# Patient Record
Sex: FEMALE | Race: OTHER | NOT HISPANIC OR LATINO
[De-identification: names, ages, dates, MRNs, and addresses within clinical notes are randomized per-mention and may not be internally consistent; named-entity substitution may affect disease eponyms.]

---

## 2020-12-14 ENCOUNTER — TRANSCRIPTION ENCOUNTER (OUTPATIENT)
Age: 65
End: 2020-12-14

## 2022-07-19 ENCOUNTER — NON-APPOINTMENT (OUTPATIENT)
Age: 67
End: 2022-07-19

## 2022-10-16 ENCOUNTER — NON-APPOINTMENT (OUTPATIENT)
Age: 67
End: 2022-10-16

## 2024-02-06 ENCOUNTER — NON-APPOINTMENT (OUTPATIENT)
Age: 69
End: 2024-02-06

## 2024-03-15 PROBLEM — Z00.00 ENCOUNTER FOR PREVENTIVE HEALTH EXAMINATION: Status: ACTIVE | Noted: 2024-03-15

## 2024-03-23 ENCOUNTER — NON-APPOINTMENT (OUTPATIENT)
Age: 69
End: 2024-03-23

## 2024-03-25 ENCOUNTER — NON-APPOINTMENT (OUTPATIENT)
Age: 69
End: 2024-03-25

## 2024-03-25 ENCOUNTER — APPOINTMENT (OUTPATIENT)
Dept: THORACIC SURGERY | Facility: CLINIC | Age: 69
End: 2024-03-25
Payer: MEDICARE

## 2024-03-25 VITALS
HEIGHT: 61 IN | RESPIRATION RATE: 17 BRPM | SYSTOLIC BLOOD PRESSURE: 151 MMHG | WEIGHT: 150 LBS | HEART RATE: 78 BPM | DIASTOLIC BLOOD PRESSURE: 83 MMHG | BODY MASS INDEX: 28.32 KG/M2 | OXYGEN SATURATION: 98 %

## 2024-03-25 DIAGNOSIS — Z87.891 PERSONAL HISTORY OF NICOTINE DEPENDENCE: ICD-10-CM

## 2024-03-25 DIAGNOSIS — M34.1 CR(E)ST SYNDROME: ICD-10-CM

## 2024-03-25 DIAGNOSIS — I10 ESSENTIAL (PRIMARY) HYPERTENSION: ICD-10-CM

## 2024-03-25 PROCEDURE — 99204 OFFICE O/P NEW MOD 45 MIN: CPT

## 2024-03-25 RX ORDER — RAMIPRIL 5 MG/1
5 CAPSULE ORAL
Refills: 0 | Status: ACTIVE | COMMUNITY

## 2024-03-25 RX ORDER — NIFEDIPINE 60 MG
60 TABLET, EXTENDED RELEASE ORAL
Refills: 0 | Status: ACTIVE | COMMUNITY

## 2024-03-25 RX ORDER — DIAZEPAM 10 MG/1
10 TABLET ORAL
Refills: 0 | Status: ACTIVE | COMMUNITY

## 2024-03-25 RX ORDER — PANTOPRAZOLE SODIUM 40 MG/1
40 GRANULE, DELAYED RELEASE ORAL
Refills: 0 | Status: ACTIVE | COMMUNITY

## 2024-03-25 NOTE — ASSESSMENT
[FreeTextEntry1] : Ms. Quiana Lundberg, 68 year old female, former smoker, w/ hx of HTN, CREST syndrome, IPF, who c/o cough, went to urgent care, was given a course of ABX with no relief, f/u Pulmonologist, + for Human Metapneumovirus, CT chest on 02/26/2024, which showed a LLL nodule with mediastinal lymphadenopathy. Subsequent PET/CT showed FDG avid LLL nodule. Referred by Dr. Nj Rosas (Pulm) for evaluation.   CT chest on 06/10/2021: (ZP) - FINDINGS CONSISTENT WITH INTERSTITIAL PULMONARY FIBROSIS, AGAIN MOST SEVERE AT BOTH LUNG BASES. THESE FINDINGS APPEAR UNCHANGED COMPARED WITH THE PRIOR STUDY. NO ACUTE INFILTRATE OR CONSOLIDATION IS SEEN. - NO SUSPICIOUS PULMONARY NODULE OR MASS LESION IS SEEN.  CT chest on 02/26/2024: (PH) - bilateral lower lobe subpleural groundglass opacities with associated mild traction bronchiectasis. This is more prominent on the left than on the right.  - In addition, there are subpleural cysts in both upper lobes anteriorly. The findings are compatible with interstitial lung disease.  - There is a 1.8 x 1.8 cm irregular masslike opacity in the superior segment of the left lower lobe with associated central lucency. It is unclear if this represents an infectious process or a cavitating malignancy. Further evaluation beginning with a PET CT scan is suggested. - There is a 1.6 x 1.3 cm pretracheal lymph node on image 17 and a 0.8 x 2.1 cm subcarinal lymph node on image 26. Other smaller mediastinal lymph nodes are also noted. These could also be evaluated at the time of the patient's PET CT scan.   PET/CT on 03/18/2024: (PH) - The left lower lobe superior segment opacity 1.8 x 1.8 cm is hypermetabolic SUV 4.1. The size has not significantly changed since the prior chest CT February 26, 2024. Suspicion for malignancy is increased.   Patient was diagnosed with UTI yesterday, started on cephalexin for 7 days. She woke up this morning with left pink eye.   I have reviewed the patient's medical records and diagnostic images at time of this office consultation and have made the following recommendation: 1. CT chest and PET/CT reviewed and explained to patient, LLL nodule is PET avid, unknown etiology, given hx of smoking, malignancy cannot be excluded. We discussed bx vs surgical resection. However, patient is reluctant for any invasive intervention. Therefore, will get Nodify test, obtain patient PFTs report, obtain patient ECHO report in 11/2023. RTC after to discuss plan of care. I have answered all of the patients questions and she understands the importance of follow up.  I, BYRON Ortiz, personally performed the evaluation and management (E/M) services for this new patient.  That E/M includes conducting the initial examination, assessing all conditions, and establishing the plan of care.  Today, my ACP, JORGE Dee-C, was here to observe my evaluation and management services for this patient to be followed going forward.

## 2024-03-25 NOTE — CONSULT LETTER
[FreeTextEntry2] : Dr. Nj Rosas (Pulm/Ref) [FreeTextEntry3] : Thien Reina MD  Attending Surgeon  Division of Thoracic Surgery  , Cardiovascular and Thoracic Surgery  [facility] at [facility]

## 2024-03-25 NOTE — HISTORY OF PRESENT ILLNESS
[FreeTextEntry1] : Ms. Quiana Lundberg, 68 year old female, former smoker, w/ hx of HTN, CREST syndrome, IPF, who c/o cough, went to urgent care, was given a course of ABX with no relief, f/u Pulmonologist, + for Human Metapneumovirus, CT chest on 02/26/2024, which showed a LLL nodule with mediastinal lymphadenopathy. Subsequent PET/CT showed FDG avid LLL nodule. Referred by Dr. Nj Rosas (Pulm) for evaluation.   CT chest on 06/10/2021: (ZP) - FINDINGS CONSISTENT WITH INTERSTITIAL PULMONARY FIBROSIS, AGAIN MOST SEVERE AT BOTH LUNG BASES. THESE FINDINGS APPEAR UNCHANGED COMPARED WITH THE PRIOR STUDY. NO ACUTE INFILTRATE OR CONSOLIDATION IS SEEN. - NO SUSPICIOUS PULMONARY NODULE OR MASS LESION IS SEEN.  CT chest on 02/26/2024: (PH) - bilateral lower lobe subpleural groundglass opacities with associated mild traction bronchiectasis. This is more prominent on the left than on the right.  - In addition, there are subpleural cysts in both upper lobes anteriorly. The findings are compatible with interstitial lung disease.  - There is a 1.8 x 1.8 cm irregular masslike opacity in the superior segment of the left lower lobe with associated central lucency. It is unclear if this represents an infectious process or a cavitating malignancy. Further evaluation beginning with a PET CT scan is suggested. - There is a 1.6 x 1.3 cm pretracheal lymph node on image 17 and a 0.8 x 2.1 cm subcarinal lymph node on image 26. Other smaller mediastinal lymph nodes are also noted. These could also be evaluated at the time of the patient's PET CT scan.   PET/CT on 03/18/2024: (PH) - The left lower lobe superior segment opacity 1.8 x 1.8 cm is hypermetabolic SUV 4.1. The size has not significantly changed since the prior chest CT February 26, 2024. Suspicion for malignancy is increased.   Patient was diagnosed with UTI yesterday, started on cephalexin for 7 days. She woke up this morning with left pink eye.   Patient is here today for CT surgery consultation. Patient c/o chronic cough, denies shortness of breath, chest pain, fever, chills, loss of appetite, weight loss, or hemoptysis.

## 2024-03-25 NOTE — PHYSICAL EXAM
[General Appearance - Alert] : alert [General Appearance - In No Acute Distress] : in no acute distress [Sclera] : the sclera and conjunctiva were normal [PERRL With Normal Accommodation] : pupils were equal in size, round, and reactive to light [Extraocular Movements] : extraocular movements were intact [Outer Ear] : the ears and nose were normal in appearance [Neck Appearance] : the appearance of the neck was normal [Oropharynx] : the oropharynx was normal [Neck Cervical Mass (___cm)] : no neck mass was observed [Jugular Venous Distention Increased] : there was no jugular-venous distention [Thyroid Diffuse Enlargement] : the thyroid was not enlarged [Thyroid Nodule] : there were no palpable thyroid nodules [Auscultation Breath Sounds / Voice Sounds] : lungs were clear to auscultation bilaterally [Heart Rate And Rhythm] : heart rate was normal and rhythm regular [Heart Sounds] : normal S1 and S2 [Heart Sounds Gallop] : no gallops [Heart Sounds Pericardial Friction Rub] : no pericardial rub [Murmurs] : no murmurs [Examination Of The Chest] : the chest was normal in appearance [Chest Visual Inspection Thoracic Asymmetry] : no chest asymmetry [Diminished Respiratory Excursion] : normal chest expansion [2+] : right 2+ [No Abnormalities] : the abdominal aorta was not enlarged and no bruit was heard [Breast Palpation Mass] : no palpable masses [Breast Appearance] : normal in appearance [Bowel Sounds] : normal bowel sounds [Abdomen Soft] : soft [Abdomen Tenderness] : non-tender [Abdomen Mass (___ Cm)] : no abdominal mass palpated [Cervical Lymph Nodes Enlarged Posterior Bilaterally] : posterior cervical [Cervical Lymph Nodes Enlarged Anterior Bilaterally] : anterior cervical [Supraclavicular Lymph Nodes Enlarged Bilaterally] : supraclavicular [No CVA Tenderness] : no ~M costovertebral angle tenderness [No Spinal Tenderness] : no spinal tenderness [Abnormal Walk] : normal gait [Musculoskeletal - Swelling] : no joint swelling seen [Nail Clubbing] : no clubbing  or cyanosis of the fingernails [Skin Color & Pigmentation] : normal skin color and pigmentation [Motor Tone] : muscle strength and tone were normal [Skin Turgor] : normal skin turgor [] : no rash [Deep Tendon Reflexes (DTR)] : deep tendon reflexes were 2+ and symmetric [Sensation] : the sensory exam was normal to light touch and pinprick [No Focal Deficits] : no focal deficits [Oriented To Time, Place, And Person] : oriented to person, place, and time [Affect] : the affect was normal [Impaired Insight] : insight and judgment were intact [Right Carotid Bruit] : no bruit heard over the right carotid [Left Carotid Bruit] : no bruit heard over the left carotid [Right Femoral Bruit] : no bruit heard over the right femoral artery [Left Femoral Bruit] : no bruit heard over the left femoral artery [FreeTextEntry1] : Deferred

## 2024-03-26 ENCOUNTER — TRANSCRIPTION ENCOUNTER (OUTPATIENT)
Age: 69
End: 2024-03-26

## 2024-04-02 ENCOUNTER — NON-APPOINTMENT (OUTPATIENT)
Age: 69
End: 2024-04-02

## 2024-04-08 ENCOUNTER — APPOINTMENT (OUTPATIENT)
Dept: THORACIC SURGERY | Facility: CLINIC | Age: 69
End: 2024-04-08
Payer: MEDICARE

## 2024-04-08 VITALS
DIASTOLIC BLOOD PRESSURE: 99 MMHG | WEIGHT: 150 LBS | HEIGHT: 61 IN | OXYGEN SATURATION: 96 % | BODY MASS INDEX: 28.32 KG/M2 | SYSTOLIC BLOOD PRESSURE: 157 MMHG | HEART RATE: 102 BPM | RESPIRATION RATE: 17 BRPM

## 2024-04-08 PROCEDURE — 99214 OFFICE O/P EST MOD 30 MIN: CPT

## 2024-04-08 NOTE — HISTORY OF PRESENT ILLNESS
[FreeTextEntry1] : Ms. Quiana Lundberg, 68 year old female, former smoker, w/ hx of HTN, CREST syndrome, IPF, who c/o cough, went to urgent care, was given a course of ABX with no relief, f/u Pulmonologist, + for Human Metapneumovirus, CT chest on 02/26/2024, which showed a LLL nodule with mediastinal lymphadenopathy. Subsequent PET/CT showed FDG avid LLL nodule. Referred by Dr. Nj Rosas (Pulm) for evaluation.   CT chest on 06/10/2021: (ZP) - FINDINGS CONSISTENT WITH INTERSTITIAL PULMONARY FIBROSIS, AGAIN MOST SEVERE AT BOTH LUNG BASES. THESE FINDINGS APPEAR UNCHANGED COMPARED WITH THE PRIOR STUDY. - NO ACUTE INFILTRATE OR CONSOLIDATION IS SEEN. - NO SUSPICIOUS PULMONARY NODULE OR MASS LESION IS SEEN.  CT chest on 02/26/2024: (PH) - bilateral lower lobe subpleural groundglass opacities with associated mild traction bronchiectasis. This is more prominent on the left than on the right.  - In addition, there are subpleural cysts in both upper lobes anteriorly. The findings are compatible with interstitial lung disease.  - There is a 1.8 x 1.8 cm irregular masslike opacity in the superior segment of the left lower lobe with associated central lucency. It is unclear if this represents an infectious process or a cavitating malignancy. Further evaluation beginning with a PET CT scan is suggested. - There is a 1.6 x 1.3 cm pretracheal lymph node on image 17 and a 0.8 x 2.1 cm subcarinal lymph node on image 26. Other smaller mediastinal lymph nodes are also noted. These could also be evaluated at the time of the patient's PET CT scan.   PET/CT on 03/18/2024: (PH) - The left lower lobe superior segment opacity 1.8 x 1.8 cm is hypermetabolic SUV 4.1. The size has not significantly changed since the prior chest CT February 26, 2024. Suspicion for malignancy is increased.   Patient was diagnosed with UTI yesterday, started on cephalexin for 7 days. She woke up this morning with left pink eye.   Nodify test on 3/27/24: 27%.  PFTs on 11/01/2023: FEV1 1.95 (94%), DLCO 70%.   Patient is here today for a follow up.

## 2024-04-08 NOTE — ASSESSMENT
[FreeTextEntry1] : Ms. Quiana Lundberg, 68 year old female, former smoker, w/ hx of HTN, CREST syndrome, IPF, who c/o cough, went to urgent care, was given a course of ABX with no relief, f/u Pulmonologist, + for Human Metapneumovirus, CT chest on 02/26/2024, which showed a LLL nodule with mediastinal lymphadenopathy. Subsequent PET/CT showed FDG avid LLL nodule. Referred by Dr. Nj Rosas (Pulm) for evaluation.   CT chest on 06/10/2021: (ZP) - FINDINGS CONSISTENT WITH INTERSTITIAL PULMONARY FIBROSIS, AGAIN MOST SEVERE AT BOTH LUNG BASES. THESE FINDINGS APPEAR UNCHANGED COMPARED WITH THE PRIOR STUDY. NO ACUTE INFILTRATE OR CONSOLIDATION IS SEEN. - NO SUSPICIOUS PULMONARY NODULE OR MASS LESION IS SEEN.  CT chest on 02/26/2024: (PH) - bilateral lower lobe subpleural groundglass opacities with associated mild traction bronchiectasis. This is more prominent on the left than on the right.  - In addition, there are subpleural cysts in both upper lobes anteriorly. The findings are compatible with interstitial lung disease.  - There is a 1.8 x 1.8 cm irregular masslike opacity in the superior segment of the left lower lobe with associated central lucency. It is unclear if this represents an infectious process or a cavitating malignancy. Further evaluation beginning with a PET CT scan is suggested. - There is a 1.6 x 1.3 cm pretracheal lymph node on image 17 and a 0.8 x 2.1 cm subcarinal lymph node on image 26. Other smaller mediastinal lymph nodes are also noted. These could also be evaluated at the time of the patient's PET CT scan.   PET/CT on 03/18/2024: (PH) - The left lower lobe superior segment opacity 1.8 x 1.8 cm is hypermetabolic SUV 4.1. The size has not significantly changed since the prior chest CT February 26, 2024. Suspicion for malignancy is increased.   Patient was diagnosed with UTI yesterday, started on cephalexin for 7 days. She woke up this morning with left pink eye.   I have reviewed the patient's medical records and diagnostic images at time of this office consultation and have made the following recommendation: 1.  CT and PET scans reviewed again, d/w Dr. Rosas, agreed that this LLL nodule should be resected. Patient will be on vacation and will be returning on 4/29/24, I will schedule a repeat CT Chest w/o contrast upon her return, then schedule her for a Lt VATS R.A. LLLobectomy, MLND. Risks and benefits and alternatives explained to patient, all questions answered, patient agreed to proceed with surgery. 2. Cardiac clearance and PST prior to surgery.  Preoperative checklist:   - Anticoagulation/antiplatelets: None - SGLT-2 Inhibitors (discontinued 3 days prior to surgery) or GLP-1 (discontinued 1 week prior to surgery): None - Pacemaker: None - Allergies: None   I, BYRON Ortiz, personally performed the evaluation and management (E/M) services for this established patient who presents today with (a) new problem(s)/exacerbation of (an) existing condition(s). That E/M includes conducting the examination, assessing all new/exacerbated conditions, and establishing a new plan of care. Today, my ACP, Philip Rosas NP was here to observe my evaluation and management services for this new problem/exacerbated condition to be followed going forward.

## 2024-04-08 NOTE — DATA REVIEWED
[FreeTextEntry1] : I have independently reviewed the following: Nodify test on 3/27/24: 27%. PFTs on 11/01/2023: FEV1 1.95 (94%), DLCO 70%.

## 2024-04-08 NOTE — CONSULT LETTER
[Dear  ___] : Dear  [unfilled], [Consult Letter:] : I had the pleasure of evaluating your patient, [unfilled]. [Please see my note below.] : Please see my note below. [Consult Closing:] : Thank you very much for allowing me to participate in the care of this patient.  If you have any questions, please do not hesitate to contact me. [Sincerely,] : Sincerely, [FreeTextEntry2] : Dr. Nj Rosas (Pulm/Ref) [FreeTextEntry3] : Thien Reina MD  Attending Surgeon  Division of Thoracic Surgery  , Cardiovascular and Thoracic Surgery  [facility] at [facility]

## 2024-04-12 PROBLEM — R59.0 MEDIASTINAL LYMPHADENOPATHY: Status: ACTIVE | Noted: 2024-03-15

## 2024-04-15 ENCOUNTER — APPOINTMENT (OUTPATIENT)
Dept: THORACIC SURGERY | Facility: CLINIC | Age: 69
End: 2024-04-15
Payer: MEDICARE

## 2024-04-15 DIAGNOSIS — R59.0 LOCALIZED ENLARGED LYMPH NODES: ICD-10-CM

## 2024-04-15 PROCEDURE — 99443: CPT

## 2024-04-15 NOTE — REASON FOR VISIT
[Follow-Up: _____] : a [unfilled] follow-up visit [Home] : at home, [unfilled] , at the time of the visit. [Medical Office: ([facility])___] : at the medical office located in  [Verbal consent obtained from patient] : the patient, [unfilled]

## 2024-04-15 NOTE — CONSULT LETTER
[Dear  ___] : Dear  [unfilled], [Consult Letter:] : I had the pleasure of evaluating your patient, [unfilled]. [Please see my note below.] : Please see my note below. [Consult Closing:] : Thank you very much for allowing me to participate in the care of this patient.  If you have any questions, please do not hesitate to contact me. [Sincerely,] : Sincerely, [FreeTextEntry2] : Dr. Nj Rosas (Pulm/Ref) [FreeTextEntry3] : Thien Reina MD  Attending Surgeon  Division of Thoracic Surgery  , Cardiovascular and Thoracic Surgery  [facility] at [facility]

## 2024-04-15 NOTE — HISTORY OF PRESENT ILLNESS
[FreeTextEntry1] :  Ms. Quiana Lundberg, 68 year old female, former smoker, w/ hx of HTN, CREST syndrome, IPF, who c/o cough, went to urgent care, was given a course of ABX with no relief, f/u Pulmonologist, + for Human Metapneumovirus, CT chest on 02/26/2024, which showed a LLL nodule with mediastinal lymphadenopathy. Subsequent PET/CT showed FDG avid LLL nodule. Referred by Dr. Nj Rosas (Pulm) for evaluation.   CT chest on 06/10/2021: (ZP) - FINDINGS CONSISTENT WITH INTERSTITIAL PULMONARY FIBROSIS, AGAIN MOST SEVERE AT BOTH LUNG BASES. THESE FINDINGS APPEAR UNCHANGED COMPARED WITH THE PRIOR STUDY. - NO ACUTE INFILTRATE OR CONSOLIDATION IS SEEN. - NO SUSPICIOUS PULMONARY NODULE OR MASS LESION IS SEEN.  CT chest on 02/26/2024: (PH) - bilateral lower lobe subpleural groundglass opacities with associated mild traction bronchiectasis. This is more prominent on the left than on the right.  - In addition, there are subpleural cysts in both upper lobes anteriorly. The findings are compatible with interstitial lung disease.  - There is a 1.8 x 1.8 cm irregular masslike opacity in the superior segment of the left lower lobe with associated central lucency. It is unclear if this represents an infectious process or a cavitating malignancy. Further evaluation beginning with a PET CT scan is suggested. - There is a 1.6 x 1.3 cm pretracheal lymph node on image 17 and a 0.8 x 2.1 cm subcarinal lymph node on image 26. Other smaller mediastinal lymph nodes are also noted. These could also be evaluated at the time of the patient's PET CT scan.   PET/CT on 03/18/2024: (PH) - The left lower lobe superior segment opacity 1.8 x 1.8 cm is hypermetabolic SUV 4.1. The size has not significantly changed since the prior chest CT February 26, 2024. Suspicion for malignancy is increased.   Patient was diagnosed with UTI yesterday, started on cephalexin for 7 days. She woke up this morning with left pink eye.   Nodify test on 3/27/24: 27%.   PFTs on 11/01/2023: FEV1 1.95 (94%), DLCO 70%.   Patient is followed today via Telephonic visit.

## 2024-04-15 NOTE — ASSESSMENT
[FreeTextEntry1] : Ms. Quiana Lundberg, 68 year old female, former smoker, w/ hx of HTN, CREST syndrome, IPF, who c/o cough, went to urgent care, was given a course of ABX with no relief, f/u Pulmonologist, + for Human Metapneumovirus, CT chest on 02/26/2024, which showed a LLL nodule with mediastinal lymphadenopathy. Subsequent PET/CT showed FDG avid LLL nodule. Referred by Dr. Nj Rosas (Pulm) for evaluation.   CT chest on 06/10/2021: (ZP) - FINDINGS CONSISTENT WITH INTERSTITIAL PULMONARY FIBROSIS, AGAIN MOST SEVERE AT BOTH LUNG BASES. THESE FINDINGS APPEAR UNCHANGED COMPARED WITH THE PRIOR STUDY. NO ACUTE INFILTRATE OR CONSOLIDATION IS SEEN. - NO SUSPICIOUS PULMONARY NODULE OR MASS LESION IS SEEN.  CT chest on 02/26/2024: (PH) - bilateral lower lobe subpleural groundglass opacities with associated mild traction bronchiectasis. This is more prominent on the left than on the right.  - In addition, there are subpleural cysts in both upper lobes anteriorly. The findings are compatible with interstitial lung disease.  - There is a 1.8 x 1.8 cm irregular masslike opacity in the superior segment of the left lower lobe with associated central lucency. It is unclear if this represents an infectious process or a cavitating malignancy. Further evaluation beginning with a PET CT scan is suggested. - There is a 1.6 x 1.3 cm pretracheal lymph node on image 17 and a 0.8 x 2.1 cm subcarinal lymph node on image 26. Other smaller mediastinal lymph nodes are also noted. These could also be evaluated at the time of the patient's PET CT scan.   PET/CT on 03/18/2024: (PH) - The left lower lobe superior segment opacity 1.8 x 1.8 cm is hypermetabolic SUV 4.1. The size has not significantly changed since the prior chest CT February 26, 2024. Suspicion for malignancy is increased.   Patient was diagnosed with UTI yesterday, started on cephalexin for 7 days. She woke up this morning with left pink eye.   I have reviewed the patient's medical records and diagnostic images at time of this office consultation and have made the following recommendation: 1.  Scan and Nodify reviewed again and explained to patient, patient is at reduced risk for malignancy. I think should be resected. However, patient is reluctant for lung resection. Therefore, will refer patient to Dr. Barron for a CT guided biopsy of LLL nodule first, RTC after to discuss plan of care. I have answered all of the patients questions and she understands the importance of follow up.   I, BYRON Ortiz, personally performed the evaluation and management (E/M) services for this established patient who follow up today with an existing condition.  That E/M includes conducting the examination, assessing all new/exacerbated/existing conditions, and establishing a plan of care.  Today, my ACP, Alexus Knutson ANP-C, was here to observe my evaluation and management services for this existing condition to be followed going forward.

## 2024-04-16 ENCOUNTER — NON-APPOINTMENT (OUTPATIENT)
Age: 69
End: 2024-04-16

## 2024-05-13 ENCOUNTER — APPOINTMENT (OUTPATIENT)
Dept: INTERVENTIONAL RADIOLOGY/VASCULAR | Facility: CLINIC | Age: 69
End: 2024-05-13
Payer: MEDICARE

## 2024-05-13 VITALS
WEIGHT: 152 LBS | RESPIRATION RATE: 17 BRPM | HEART RATE: 69 BPM | HEIGHT: 61 IN | BODY MASS INDEX: 28.7 KG/M2 | DIASTOLIC BLOOD PRESSURE: 86 MMHG | SYSTOLIC BLOOD PRESSURE: 165 MMHG | OXYGEN SATURATION: 100 %

## 2024-05-13 DIAGNOSIS — Z78.9 OTHER SPECIFIED HEALTH STATUS: ICD-10-CM

## 2024-05-13 DIAGNOSIS — Z80.49 FAMILY HISTORY OF MALIGNANT NEOPLASM OF OTHER GENITAL ORGANS: ICD-10-CM

## 2024-05-13 DIAGNOSIS — R91.1 SOLITARY PULMONARY NODULE: ICD-10-CM

## 2024-05-13 PROCEDURE — 99204 OFFICE O/P NEW MOD 45 MIN: CPT

## 2024-05-13 RX ORDER — ASPIRIN 81 MG
81 TABLET, DELAYED RELEASE (ENTERIC COATED) ORAL
Refills: 0 | Status: COMPLETED | COMMUNITY
End: 2024-05-13

## 2024-05-13 NOTE — PHYSICAL EXAM
[Alert] : alert [No Respiratory Distress] : no respiratory distress [No Accessory Muscle Use] : no accessory muscle use [Clear to Auscultation] : lungs were clear to auscultation bilaterally [Normal Rate] : heart rate was normal  [Oriented x3] : oriented to person, place, and time

## 2024-05-13 NOTE — ASSESSMENT
[FreeTextEntry1] : 68 year old female, former smoker, w/ hx of HTN, CREST syndrome, IPF, who c/o cough, went to urgent care, was given a course of ABX with no relief, f/u Pulmonologist, + for Human Metapneumovirus, CT chest on 02/26/2024, which showed a LLL nodule with mediastinal lymphadenopathy. Subsequent PET/CT showed FDG avid LLL nodule. Patient is now being referred by Dr. Reina for consultation to discuss LLL lung mass bx.   The patient is an appropriate candidate for CT-guided left lower lobe lung mass biopsy.  The full procedure of CT guided lung nodule biopsy was discussed with the patient. This included a discussion of the risks, benefits, and alternatives. Risks discussed included, but were not limited to, bleeding including hemoptysis or hemothorax, pneumothorax, lung or adjacent organ injury, and inadequate specimen. The potential need for admission or intubation and additional procedures including chest tube insertion were discussed. Ample time was provided to answer their questions. Consent was obtained at the time of consultation.  Plan: CT-guided left lower lobe lung mass biopsy with sedation. Patient in prone position. Scan before sedation PET CT 3/18/24 3-88

## 2024-05-13 NOTE — HISTORY OF PRESENT ILLNESS
[FreeTextEntry1] : 68 year old female, former smoker, w/ hx of HTN, CREST syndrome, IPF, who c/o cough, went to urgent care, was given a course of ABX with no relief, f/u Pulmonologist, + for Human Metapneumovirus, CT chest on 02/26/2024, which showed a LLL nodule with mediastinal lymphadenopathy. Subsequent PET/CT showed FDG avid LLL nodule.   CT chest on 02/26/2024: (PH) bilateral lower lobe subpleural groundglass opacities with associated mild traction bronchiectasis. This is more prominent on the left than on the right. In addition, there are subpleural cysts in both upper lobes anteriorly. The findings are compatible with interstitial lung disease. There is a 1.8 x 1.8 cm irregular masslike opacity in the superior segment of the left lower lobe with associated central lucency. It is unclear if this represents an infectious process or a cavitating malignancy. Further evaluation beginning with a PET CT scan is suggested. There is a 1.6 x 1.3 cm pretracheal lymph node on image 17 and a 0.8 x 2.1 cm subcarinal lymph node on image 26. Other smaller mediastinal lymph nodes are also noted. These could also be evaluated at the time of the patient's PET CT scan.  PET/CT on 03/18/2024: (PH)The left lower lobe superior segment opacity 1.8 x 1.8 cm is hypermetabolic SUV 4.1. The size has not significantly changed since the prior chest CT February 26, 2024. Suspicion for malignancy is increased.  Patient is now being referred by Dr. Reina for consultation to discuss LLL lung mass bx.   Denies any recent SOB, CP, fever, chills, n/v/d.

## 2024-05-15 ENCOUNTER — LABORATORY RESULT (OUTPATIENT)
Age: 69
End: 2024-05-15

## 2024-05-15 RX ORDER — SODIUM CHLORIDE 9 MG/ML
1000 INJECTION, SOLUTION INTRAVENOUS
Refills: 0 | Status: DISCONTINUED | OUTPATIENT
Start: 2024-05-16 | End: 2024-05-30

## 2024-05-15 NOTE — PRE PROCEDURE NOTE - HISTORY OF PRESENT ILLNESS
Interventional Radiology  Pre-Procedure Note    This is a 68y  Female  presenting for lung biopsy    HPI:68 year old female, former smoker, w/ hx of HTN, CREST syndrome, IPF, who c/o cough, went to urgent care, was given a course of ABX with no relief, f/u Pulmonologist, + for Human Metapneumovirus, CT chest on 02/26/2024, which showed a LLL nodule with mediastinal lymphadenopathy. Subsequent PET/CT showed FDG avid LLL nodule.     CT chest on 02/26/2024: ([facility]) bilateral lower lobe subpleural groundglass opacities with associated mild traction bronchiectasis. This is more prominent on the left than on the right. In addition, there are subpleural cysts in both upper lobes anteriorly. The findings are compatible with interstitial lung disease. There is a 1.8 x 1.8 cm irregular masslike opacity in the superior segment of the left lower lobe with associated central lucency. It is unclear if this represents an infectious process or a cavitating malignancy. Further evaluation beginning with a PET CT scan is suggested. There is a 1.6 x 1.3 cm pretracheal lymph node on image 17 and a 0.8 x 2.1 cm subcarinal lymph node on image 26. Other smaller mediastinal lymph nodes are also noted. These could also be evaluated at the time of the patient's PET CT scan.    PET/CT on 03/18/2024: (PH)The left lower lobe superior segment opacity 1.8 x 1.8 cm is hypermetabolic SUV 4.1. The size has not significantly changed since the prior chest CT February 26, 2024. Suspicion for malignancy is increased.    Patient is now being referred by Dr. Reina for LLL lung mass bx.       PAST MEDICAL & SURGICAL HISTORY:      Social History:     FAMILY HISTORY:      Allergies: No Known Allergies      Current Medications:     Labs:   from [facility]            HCG:       Assessment/Plan:   This is a 68y Female  presents with LLL lung nodule  Patient presents to IR for biopsy.  Procedure/ risks/ benefits/ goals/ alternatives were explained. All questions answered. Informed content obtained from patient. Consent placed in chart.

## 2024-05-16 ENCOUNTER — OUTPATIENT (OUTPATIENT)
Dept: OUTPATIENT SERVICES | Facility: HOSPITAL | Age: 69
LOS: 1 days | End: 2024-05-16
Payer: MEDICARE

## 2024-05-16 ENCOUNTER — RESULT REVIEW (OUTPATIENT)
Age: 69
End: 2024-05-16

## 2024-05-16 LAB
GRAM STN FLD: SIGNIFICANT CHANGE UP
SPECIMEN SOURCE: SIGNIFICANT CHANGE UP

## 2024-05-16 PROCEDURE — 71045 X-RAY EXAM CHEST 1 VIEW: CPT | Mod: 26

## 2024-05-16 PROCEDURE — 32408 CORE NDL BX LNG/MED PERQ: CPT

## 2024-05-16 NOTE — PROCEDURE NOTE - PROCEDURE FINDINGS AND DETAILS
Successful CT guided biopsy of a LLL lung nodule biopsy. Multiple cores were obtained and adequacy was confirmed with cytopathology personal.  Pt tolerated the procedure well without immediate complication.

## 2024-05-17 ENCOUNTER — RESULT REVIEW (OUTPATIENT)
Age: 69
End: 2024-05-17

## 2024-05-17 LAB
NIGHT BLUE STAIN TISS: SIGNIFICANT CHANGE UP
NON-GYNECOLOGICAL CYTOLOGY STUDY: SIGNIFICANT CHANGE UP
SPECIMEN SOURCE: SIGNIFICANT CHANGE UP

## 2024-05-17 PROCEDURE — 88305 TISSUE EXAM BY PATHOLOGIST: CPT | Mod: 26

## 2024-05-17 PROCEDURE — 88173 CYTOPATH EVAL FNA REPORT: CPT | Mod: 26

## 2024-05-17 PROCEDURE — 88360 TUMOR IMMUNOHISTOCHEM/MANUAL: CPT | Mod: 26

## 2024-05-17 PROCEDURE — 88333 PATH CONSLTJ SURG CYTO XM 1: CPT | Mod: 26,59

## 2024-05-20 DIAGNOSIS — R91.1 SOLITARY PULMONARY NODULE: ICD-10-CM

## 2024-05-21 LAB
CULTURE RESULTS: NO GROWTH — SIGNIFICANT CHANGE UP
SPECIMEN SOURCE: SIGNIFICANT CHANGE UP

## 2024-05-29 ENCOUNTER — NON-APPOINTMENT (OUTPATIENT)
Age: 69
End: 2024-05-29

## 2024-05-29 ENCOUNTER — APPOINTMENT (OUTPATIENT)
Dept: THORACIC SURGERY | Facility: CLINIC | Age: 69
End: 2024-05-29
Payer: MEDICARE

## 2024-05-29 VITALS
OXYGEN SATURATION: 96 % | HEIGHT: 61 IN | HEART RATE: 86 BPM | WEIGHT: 152 LBS | DIASTOLIC BLOOD PRESSURE: 92 MMHG | BODY MASS INDEX: 28.7 KG/M2 | SYSTOLIC BLOOD PRESSURE: 174 MMHG

## 2024-05-29 PROCEDURE — 99215 OFFICE O/P EST HI 40 MIN: CPT

## 2024-05-29 NOTE — ASSESSMENT
[FreeTextEntry1] : Ms. Quiana Lundberg is a 68-year-old female, former smoker, w/ hx of HTN, CREST syndrome, IPF, who c/o cough, went to urgent care, was given a course of ABX with no relief, f/u Pulmonologist, + for Human Metapneumovirus, CT chest on 02/26/2024, which showed a LLL nodule with mediastinal lymphadenopathy. Subsequent PET/CT showed FDG avid LLL nodule. Referred by Dr. Nj Rosas (Pulm) for evaluation.   Pt was told the lesion was likely carcinoma and should be resected however opted for biopsy prior to resection. The Patient is now s/p CT guided biopsy of LLL nodule on 05/17/2024. Path revealed Squamous cell carcinoma.   I have discussed Robotic assisted resection with the patient. The risks benefits and alternatives of the procedure were explained to the patient including but not limited to the risks of bleeding, infection, prolonged air leak, shortness of breath, oxygen dependance, cardiac arrhythmias, lymphatic leak, nerve injury, hoarseness and pain. I have answered all of her questions, and she understands. I will plan for the procedure in the near future.    I, BYRON Ortiz, personally performed the evaluation and management (E/M) services for this established patient who presents today with (a) new problem(s)/exacerbation of (an) existing condition(s). That E/M includes conducting the examination, assessing all new/exacerbated conditions, and establishing a new plan of care. Today, my ACP, JENAE Latif was here to observe my evaluation and management services for this new problem/exacerbated condition to be followed going forward.

## 2024-05-29 NOTE — CONSULT LETTER
[FreeTextEntry2] : Dr. Nj Rosas (Pulm/Ref) [FreeTextEntry3] : Thien Reina MD  Attending Surgeon  Division of Thoracic Surgery  , Cardiovascular and Thoracic Surgery  [facility] [employer] [facility]

## 2024-05-29 NOTE — HISTORY OF PRESENT ILLNESS
[FreeTextEntry1] : Ms. Quiana Lundberg, 68 year old female, former smoker, w/ hx of HTN, CREST syndrome, IPF, who c/o cough, went to urgent care, was given a course of ABX with no relief, f/u Pulmonologist, + for Human Metapneumovirus, CT chest on 02/26/2024, which showed a LLL nodule with mediastinal lymphadenopathy. Subsequent PET/CT showed FDG avid LLL nodule. Referred by Dr. Nj Rosas (Pulm) for evaluation.   CT chest on 06/10/2021: ([facility]) - FINDINGS CONSISTENT WITH INTERSTITIAL PULMONARY FIBROSIS, AGAIN MOST SEVERE AT BOTH LUNG BASES. THESE FINDINGS APPEAR UNCHANGED COMPARED WITH THE PRIOR STUDY. - NO ACUTE INFILTRATE OR CONSOLIDATION IS SEEN. - NO SUSPICIOUS PULMONARY NODULE OR MASS LESION IS SEEN.  CT chest on 02/26/2024: (PH) - bilateral lower lobe subpleural groundglass opacities with associated mild traction bronchiectasis. This is more prominent on the left than on the right.  - In addition, there are subpleural cysts in both upper lobes anteriorly. The findings are compatible with interstitial lung disease.  - There is a 1.8 x 1.8 cm irregular masslike opacity in the superior segment of the left lower lobe with associated central lucency. It is unclear if this represents an infectious process or a cavitating malignancy. Further evaluation beginning with a PET CT scan is suggested. - There is a 1.6 x 1.3 cm pretracheal lymph node on image 17 and a 0.8 x 2.1 cm subcarinal lymph node on image 26. Other smaller mediastinal lymph nodes are also noted. These could also be evaluated at the time of the patient's PET CT scan.   PET/CT on 03/18/2024: (PH) - The left lower lobe superior segment opacity 1.8 x 1.8 cm is hypermetabolic SUV 4.1. The size has not significantly changed since the prior chest CT February 26, 2024. Suspicion for malignancy is increased.   Patient was diagnosed with UTI yesterday, started on cephalexin for 7 days. She woke up this morning with left pink eye.   Nodify test on 3/27/24: 27%.   PFTs on 11/01/2023: FEV1 1.95 (94%), DLCO 70%.   Patient is s/p CT guided biopsy of LLL nodule on 05/17/2024. Path revealed positive for malignant cells. Squamous cell carcinoma. The cytology slides and core biopsy sections reveal a cellular specimen composed of malignant polygonal epithelioid cells with dense cytoplasm, infiltrating fibroconnective tissue in sheets and single cells with areas of keratinization. PDL-1 status will be reported as an addendum.  Patient is here today for a follow up. Reports of chronic dry cough, denies any SOB or CP.  BP is elevated, patient states that her BP was in 130s this morning, reports that she is anxious. Advised patient to monitor BP at home and f/u with cardiologist/PCP if BP is still elevated.

## 2024-06-06 ENCOUNTER — OUTPATIENT (OUTPATIENT)
Dept: OUTPATIENT SERVICES | Facility: HOSPITAL | Age: 69
LOS: 1 days | End: 2024-06-06
Payer: MEDICARE

## 2024-06-06 ENCOUNTER — APPOINTMENT (OUTPATIENT)
Dept: CT IMAGING | Facility: CLINIC | Age: 69
End: 2024-06-06
Payer: MEDICARE

## 2024-06-06 DIAGNOSIS — C34.90 MALIGNANT NEOPLASM OF UNSPECIFIED PART OF UNSPECIFIED BRONCHUS OR LUNG: ICD-10-CM

## 2024-06-06 PROCEDURE — 71250 CT THORAX DX C-: CPT

## 2024-06-06 PROCEDURE — 71250 CT THORAX DX C-: CPT | Mod: 26

## 2024-06-13 ENCOUNTER — OUTPATIENT (OUTPATIENT)
Dept: OUTPATIENT SERVICES | Facility: HOSPITAL | Age: 69
LOS: 1 days | End: 2024-06-13

## 2024-06-13 VITALS
RESPIRATION RATE: 16 BRPM | HEART RATE: 80 BPM | HEIGHT: 62 IN | WEIGHT: 149.91 LBS | OXYGEN SATURATION: 99 % | SYSTOLIC BLOOD PRESSURE: 159 MMHG | TEMPERATURE: 98 F | DIASTOLIC BLOOD PRESSURE: 86 MMHG

## 2024-06-13 DIAGNOSIS — R91.1 SOLITARY PULMONARY NODULE: ICD-10-CM

## 2024-06-13 DIAGNOSIS — Z98.890 OTHER SPECIFIED POSTPROCEDURAL STATES: Chronic | ICD-10-CM

## 2024-06-13 DIAGNOSIS — Z90.49 ACQUIRED ABSENCE OF OTHER SPECIFIED PARTS OF DIGESTIVE TRACT: Chronic | ICD-10-CM

## 2024-06-13 DIAGNOSIS — I10 ESSENTIAL (PRIMARY) HYPERTENSION: ICD-10-CM

## 2024-06-13 LAB
BLD GP AB SCN SERPL QL: NEGATIVE — SIGNIFICANT CHANGE UP
HCT VFR BLD CALC: 44.8 % — SIGNIFICANT CHANGE UP (ref 34.5–45)
HGB BLD-MCNC: 14.6 G/DL — SIGNIFICANT CHANGE UP (ref 11.5–15.5)
MCHC RBC-ENTMCNC: 30.9 PG — SIGNIFICANT CHANGE UP (ref 27–34)
MCHC RBC-ENTMCNC: 32.6 GM/DL — SIGNIFICANT CHANGE UP (ref 32–36)
MCV RBC AUTO: 94.7 FL — SIGNIFICANT CHANGE UP (ref 80–100)
NRBC # BLD: 0 /100 WBCS — SIGNIFICANT CHANGE UP (ref 0–0)
NRBC # FLD: 0 K/UL — SIGNIFICANT CHANGE UP (ref 0–0)
PLATELET # BLD AUTO: 319 K/UL — SIGNIFICANT CHANGE UP (ref 150–400)
RBC # BLD: 4.73 M/UL — SIGNIFICANT CHANGE UP (ref 3.8–5.2)
RBC # FLD: 12.5 % — SIGNIFICANT CHANGE UP (ref 10.3–14.5)
RH IG SCN BLD-IMP: POSITIVE — SIGNIFICANT CHANGE UP
RH IG SCN BLD-IMP: POSITIVE — SIGNIFICANT CHANGE UP
WBC # BLD: 10.38 K/UL — SIGNIFICANT CHANGE UP (ref 3.8–10.5)
WBC # FLD AUTO: 10.38 K/UL — SIGNIFICANT CHANGE UP (ref 3.8–10.5)

## 2024-06-13 RX ORDER — ASPIRIN/CALCIUM CARB/MAGNESIUM 324 MG
1 TABLET ORAL
Qty: 0 | Refills: 0 | DISCHARGE

## 2024-06-13 RX ORDER — DEXTROSE MONOHYDRATE AND SODIUM CHLORIDE 5; .3 G/100ML; G/100ML
1000 INJECTION, SOLUTION INTRAVENOUS
Refills: 0 | Status: DISCONTINUED | OUTPATIENT
Start: 2024-06-18 | End: 2024-06-20

## 2024-06-13 RX ORDER — SODIUM CHLORIDE 0.9 % (FLUSH) 0.9 %
3 SYRINGE (ML) INJECTION EVERY 8 HOURS
Refills: 0 | Status: DISCONTINUED | OUTPATIENT
Start: 2024-06-18 | End: 2024-06-20

## 2024-06-13 NOTE — H&P PST ADULT - NSICDXPASTMEDICALHX_GEN_ALL_CORE_FT
PAST MEDICAL HISTORY:  CREST syndrome     GERD (gastroesophageal reflux disease)     HTN (hypertension)     IPF (idiopathic pulmonary fibrosis)     Mediastinal lymphadenopathy     Mild mitral regurgitation     Osteoarthritis     Raynauds disease     RSV infection     Scleroderma     Solitary pulmonary nodule     Spinal stenosis, lumbar

## 2024-06-13 NOTE — H&P PST ADULT - NEUROLOGICAL COMMENTS
last near syncopal  episode was 11/2023. Seen by cardiologist and dx with "vertigo" Last holter monitor worn 11/2021 showed "no significant arrhythmia or significant pauses noted".

## 2024-06-13 NOTE — H&P PST ADULT - ASSESSMENT
67 y/o female with CREST syndrome, HTN, GERD, Scleroderma, Mild mitral regurgitation and former smoker presents to PST preop for left video- assisted thorascopic surgery, robotic- assisted, left lower lobe lobectomy. Pt presented to her Pulmonologist reporting a cough after being treated with antibiotics for suspected pneumonia. A CT chest on 02/26/2024 showed a LLL nodule with mediastinal lymphadenopathy. s/p CT guided biopsy of LLL nodule on 05/17/2024. Path revealed Squamous cell carcinoma.

## 2024-06-13 NOTE — H&P PST ADULT - RESPIRATORY AND THORAX COMMENTS
preop dx solitary pulmonary nodule. see HPI preop dx solitary pulmonary nodule. See HPI. "Pulmonary Fibrosis of unclear etiology" noted on CT chest from 6/12/24

## 2024-06-13 NOTE — H&P PST ADULT - HISTORY OF PRESENT ILLNESS
69 y/o female with CREST syndrome, HTN, GERD, Scleroderma, Mild mitral regurgitation and former smoker presents to PST preop for left video- assisted thorascopic surgery, robotic- assisted, left lower lobe lobectomy. Pt presented to her Pulmonologist reporting a cough after being treated with antibiotics for suspected pneumonia. A CT chest on 02/26/2024 showed a LLL nodule with mediastinal lymphadenopathy. s/p CT guided biopsy of LLL nodule on 05/17/2024. Path revealed Squamous cell carcinoma.

## 2024-06-13 NOTE — H&P PST ADULT - NEGATIVE OPHTHALMOLOGIC SYMPTOMS
no photophobia/no lacrimation L/no lacrimation R/no blurred vision L/no blurred vision R/no pain L/no pain R/no loss of vision L/no loss of vision R

## 2024-06-13 NOTE — H&P PST ADULT - ATTENDING COMMENTS
Patient seen and examined agree with above note as modified, where appropriate, by me. The risks, benefits and alternatives of the procedure were explained to the patient including but not limited to the risks of bleeding, infection, prolonged air leak, benign disease, shortness of breath, oxygen dependance, chronic pain, cardiac arrhythmias, lymphatic leak, hoarseness and nerve injury. All of the patient's questions were answered, she demonstrated understanding and freely consented to the procedure.

## 2024-06-13 NOTE — H&P PST ADULT - NSICDXPASTSURGICALHX_GEN_ALL_CORE_FT
PAST SURGICAL HISTORY:  H/O endoscopy     H/O excision of ganglion cyst     History of lung biopsy     S/P cholecystectomy     S/P colonoscopy     S/P hemorrhoidectomy     S/P rotator cuff repair

## 2024-06-15 LAB
CULTURE RESULTS: SIGNIFICANT CHANGE UP
SPECIMEN SOURCE: SIGNIFICANT CHANGE UP

## 2024-06-17 ENCOUNTER — TRANSCRIPTION ENCOUNTER (OUTPATIENT)
Age: 69
End: 2024-06-17

## 2024-06-17 NOTE — ASU PATIENT PROFILE, ADULT - FALL HARM RISK - UNIVERSAL INTERVENTIONS
Bed in lowest position, wheels locked, appropriate side rails in place/Call bell, personal items and telephone in reach/Instruct patient to call for assistance before getting out of bed or chair/Non-slip footwear when patient is out of bed/[place] to call system/Physically safe environment - no spills, clutter or unnecessary equipment/Purposeful Proactive Rounding/Room/bathroom lighting operational, light cord in reach

## 2024-06-17 NOTE — ASU PATIENT PROFILE, ADULT - CAREGIVER NAME
Implemented All Fall with Harm Risk Interventions:  [place] to call system. Call bell, personal items and telephone within reach. Instruct patient to call for assistance. Room bathroom lighting operational. Non-slip footwear when patient is off stretcher. Physically safe environment: no spills, clutter or unnecessary equipment. Stretcher in lowest position, wheels locked, appropriate side rails in place. Provide visual cue, wrist band, yellow gown, etc. Monitor gait and stability. Monitor for mental status changes and reorient to person, place, and time. Review medications for side effects contributing to fall risk. Reinforce activity limits and safety measures with patient and family. Provide visual clues: red socks.
Kelly Lopresti

## 2024-06-18 ENCOUNTER — TRANSCRIPTION ENCOUNTER (OUTPATIENT)
Age: 69
End: 2024-06-18

## 2024-06-18 ENCOUNTER — RESULT REVIEW (OUTPATIENT)
Age: 69
End: 2024-06-18

## 2024-06-18 ENCOUNTER — INPATIENT (INPATIENT)
Facility: HOSPITAL | Age: 69
LOS: 2 days | Discharge: HOME | End: 2024-06-21
Attending: THORACIC SURGERY (CARDIOTHORACIC VASCULAR SURGERY) | Admitting: THORACIC SURGERY (CARDIOTHORACIC VASCULAR SURGERY)
Payer: MEDICARE

## 2024-06-18 ENCOUNTER — APPOINTMENT (OUTPATIENT)
Dept: THORACIC SURGERY | Facility: HOSPITAL | Age: 69
End: 2024-06-18

## 2024-06-18 VITALS
HEART RATE: 83 BPM | RESPIRATION RATE: 16 BRPM | HEIGHT: 62 IN | OXYGEN SATURATION: 100 % | TEMPERATURE: 98 F | DIASTOLIC BLOOD PRESSURE: 79 MMHG | WEIGHT: 149.91 LBS | SYSTOLIC BLOOD PRESSURE: 149 MMHG

## 2024-06-18 DIAGNOSIS — Z98.890 OTHER SPECIFIED POSTPROCEDURAL STATES: Chronic | ICD-10-CM

## 2024-06-18 DIAGNOSIS — R91.1 SOLITARY PULMONARY NODULE: ICD-10-CM

## 2024-06-18 DIAGNOSIS — Z90.49 ACQUIRED ABSENCE OF OTHER SPECIFIED PARTS OF DIGESTIVE TRACT: Chronic | ICD-10-CM

## 2024-06-18 PROCEDURE — 71045 X-RAY EXAM CHEST 1 VIEW: CPT | Mod: 26

## 2024-06-18 PROCEDURE — 88313 SPECIAL STAINS GROUP 2: CPT | Mod: 26

## 2024-06-18 PROCEDURE — 32663 THORACOSCOPY W/LOBECTOMY: CPT | Mod: LT

## 2024-06-18 PROCEDURE — S2900 ROBOTIC SURGICAL SYSTEM: CPT | Mod: NC

## 2024-06-18 PROCEDURE — 32663 THORACOSCOPY W/LOBECTOMY: CPT | Mod: 80,LT

## 2024-06-18 PROCEDURE — 31622 DX BRONCHOSCOPE/WASH: CPT | Mod: 59

## 2024-06-18 PROCEDURE — 32674 THORACOSCOPY LYMPH NODE EXC: CPT

## 2024-06-18 PROCEDURE — 88305 TISSUE EXAM BY PATHOLOGIST: CPT | Mod: 26

## 2024-06-18 PROCEDURE — 88309 TISSUE EXAM BY PATHOLOGIST: CPT | Mod: 26

## 2024-06-18 PROCEDURE — 32674 THORACOSCOPY LYMPH NODE EXC: CPT | Mod: 80

## 2024-06-18 PROCEDURE — 99233 SBSQ HOSP IP/OBS HIGH 50: CPT

## 2024-06-18 DEVICE — SURGICEL 2 X 14": Type: IMPLANTABLE DEVICE | Status: FUNCTIONAL

## 2024-06-18 DEVICE — STAPLER COVIDIEN TRI-STAPLE CURVED 30MM TAN RELOAD: Type: IMPLANTABLE DEVICE | Status: FUNCTIONAL

## 2024-06-18 DEVICE — CHEST DRAIN THORACIC ARGYLE PVC 24FR STRAIGHT: Type: IMPLANTABLE DEVICE | Status: FUNCTIONAL

## 2024-06-18 DEVICE — STAPLER COVIDIEN TRI-STAPLE CURVED 45MM PURPLE RELOAD: Type: IMPLANTABLE DEVICE | Status: FUNCTIONAL

## 2024-06-18 DEVICE — STAPLER COVIDIEN TRI-STAPLE CURVED 45MM TAN RELOAD: Type: IMPLANTABLE DEVICE | Status: FUNCTIONAL

## 2024-06-18 RX ORDER — HYDROMORPHONE HCL 0.2 MG/ML
30 INJECTION, SOLUTION INTRAVENOUS
Refills: 0 | Status: DISCONTINUED | OUTPATIENT
Start: 2024-06-18 | End: 2024-06-20

## 2024-06-18 RX ORDER — ACETAMINOPHEN 325 MG
1000 TABLET ORAL ONCE
Refills: 0 | Status: COMPLETED | OUTPATIENT
Start: 2024-06-18 | End: 2024-06-19

## 2024-06-18 RX ORDER — ALBUTEROL 90 MCG
2.5 AEROSOL REFILL (GRAM) INHALATION EVERY 6 HOURS
Refills: 0 | Status: DISCONTINUED | OUTPATIENT
Start: 2024-06-18 | End: 2024-06-21

## 2024-06-18 RX ORDER — HYDROMORPHONE HCL 0.2 MG/ML
0.5 INJECTION, SOLUTION INTRAVENOUS
Refills: 0 | Status: DISCONTINUED | OUTPATIENT
Start: 2024-06-18 | End: 2024-06-20

## 2024-06-18 RX ORDER — ACETAMINOPHEN 325 MG
975 TABLET ORAL ONCE
Refills: 0 | Status: COMPLETED | OUTPATIENT
Start: 2024-06-18 | End: 2024-06-18

## 2024-06-18 RX ORDER — NIFEDIPINE 30 MG
1 TABLET, EXTENDED RELEASE 24 HR ORAL
Refills: 0 | DISCHARGE

## 2024-06-18 RX ORDER — SENNOSIDES 8.6 MG
2 TABLET ORAL AT BEDTIME
Refills: 0 | Status: DISCONTINUED | OUTPATIENT
Start: 2024-06-18 | End: 2024-06-21

## 2024-06-18 RX ORDER — PANTOPRAZOLE SODIUM 40 MG/10ML
40 INJECTION, POWDER, FOR SOLUTION INTRAVENOUS
Refills: 0 | Status: DISCONTINUED | OUTPATIENT
Start: 2024-06-18 | End: 2024-06-21

## 2024-06-18 RX ORDER — DIAZEPAM 10 MG/1
5 TABLET ORAL DAILY
Refills: 0 | Status: DISCONTINUED | OUTPATIENT
Start: 2024-06-18 | End: 2024-06-21

## 2024-06-18 RX ORDER — HEPARIN SODIUM 50 [USP'U]/ML
5000 INJECTION, SOLUTION INTRAVENOUS ONCE
Refills: 0 | Status: COMPLETED | OUTPATIENT
Start: 2024-06-18 | End: 2024-06-18

## 2024-06-18 RX ORDER — PANTOPRAZOLE SODIUM 20 MG/1
1 TABLET, DELAYED RELEASE ORAL
Refills: 0 | DISCHARGE

## 2024-06-18 RX ORDER — NALOXONE HYDROCHLORIDE 1 MG/ML
0.1 INJECTION PARENTERAL
Refills: 0 | Status: DISCONTINUED | OUTPATIENT
Start: 2024-06-18 | End: 2024-06-21

## 2024-06-18 RX ORDER — POLYETHYLENE GLYCOL 3350 17 G/17G
17 POWDER, FOR SOLUTION ORAL
Refills: 0 | DISCHARGE

## 2024-06-18 RX ORDER — ACETAMINOPHEN 325 MG
1000 TABLET ORAL ONCE
Refills: 0 | Status: COMPLETED | OUTPATIENT
Start: 2024-06-18 | End: 2024-06-18

## 2024-06-18 RX ORDER — IPRATROPIUM BROMIDE 0.5 MG/2.5ML
500 SOLUTION RESPIRATORY (INHALATION) EVERY 6 HOURS
Refills: 0 | Status: DISCONTINUED | OUTPATIENT
Start: 2024-06-18 | End: 2024-06-21

## 2024-06-18 RX ORDER — HEPARIN SODIUM 50 [USP'U]/ML
5000 INJECTION, SOLUTION INTRAVENOUS EVERY 8 HOURS
Refills: 0 | Status: DISCONTINUED | OUTPATIENT
Start: 2024-06-18 | End: 2024-06-21

## 2024-06-18 RX ORDER — ONDANSETRON HYDROCHLORIDE 2 MG/ML
4 INJECTION INTRAMUSCULAR; INTRAVENOUS EVERY 6 HOURS
Refills: 0 | Status: DISCONTINUED | OUTPATIENT
Start: 2024-06-18 | End: 2024-06-21

## 2024-06-18 RX ORDER — ACETAMINOPHEN 325 MG
1000 TABLET ORAL ONCE
Refills: 0 | Status: DISCONTINUED | OUTPATIENT
Start: 2024-06-18 | End: 2024-06-20

## 2024-06-18 RX ORDER — RAMIPRIL 5 MG
1 CAPSULE ORAL
Refills: 0 | DISCHARGE

## 2024-06-18 RX ADMIN — ONDANSETRON HYDROCHLORIDE 4 MILLIGRAM(S): 2 INJECTION INTRAMUSCULAR; INTRAVENOUS at 18:58

## 2024-06-18 RX ADMIN — HYDROMORPHONE HCL 0.5 MILLIGRAM(S): 0.2 INJECTION, SOLUTION INTRAVENOUS at 16:26

## 2024-06-18 RX ADMIN — HEPARIN SODIUM 5000 UNIT(S): 50 INJECTION, SOLUTION INTRAVENOUS at 22:22

## 2024-06-18 RX ADMIN — Medication 400 MILLIGRAM(S): at 17:54

## 2024-06-18 RX ADMIN — DEXTROSE MONOHYDRATE AND SODIUM CHLORIDE 30 MILLILITER(S): 5; .3 INJECTION, SOLUTION INTRAVENOUS at 19:29

## 2024-06-18 RX ADMIN — Medication 3 MILLILITER(S): at 23:07

## 2024-06-18 RX ADMIN — Medication 2 TABLET(S): at 22:22

## 2024-06-18 RX ADMIN — HYDROMORPHONE HCL 30 MILLILITER(S): 0.2 INJECTION, SOLUTION INTRAVENOUS at 16:25

## 2024-06-18 RX ADMIN — DEXTROSE MONOHYDRATE AND SODIUM CHLORIDE 30 MILLILITER(S): 5; .3 INJECTION, SOLUTION INTRAVENOUS at 16:25

## 2024-06-18 RX ADMIN — Medication 975 MILLIGRAM(S): at 11:37

## 2024-06-18 RX ADMIN — DEXTROSE MONOHYDRATE AND SODIUM CHLORIDE 30 MILLILITER(S): 5; .3 INJECTION, SOLUTION INTRAVENOUS at 11:36

## 2024-06-18 RX ADMIN — Medication 1000 MILLIGRAM(S): at 18:20

## 2024-06-18 RX ADMIN — HEPARIN SODIUM 5000 UNIT(S): 50 INJECTION, SOLUTION INTRAVENOUS at 11:37

## 2024-06-18 RX ADMIN — HYDROMORPHONE HCL 30 MILLILITER(S): 0.2 INJECTION, SOLUTION INTRAVENOUS at 19:30

## 2024-06-19 LAB
ANION GAP SERPL CALC-SCNC: 9 MMOL/L — SIGNIFICANT CHANGE UP (ref 7–14)
BASOPHILS # BLD AUTO: 0.01 K/UL — SIGNIFICANT CHANGE UP (ref 0–0.2)
BASOPHILS NFR BLD AUTO: 0.1 % — SIGNIFICANT CHANGE UP (ref 0–2)
BUN SERPL-MCNC: 18 MG/DL — SIGNIFICANT CHANGE UP (ref 7–23)
CALCIUM SERPL-MCNC: 9.3 MG/DL — SIGNIFICANT CHANGE UP (ref 8.4–10.5)
CHLORIDE SERPL-SCNC: 102 MMOL/L — SIGNIFICANT CHANGE UP (ref 98–107)
CO2 SERPL-SCNC: 26 MMOL/L — SIGNIFICANT CHANGE UP (ref 22–31)
CREAT SERPL-MCNC: 0.97 MG/DL — SIGNIFICANT CHANGE UP (ref 0.5–1.3)
EGFR: 63 ML/MIN/1.73M2 — SIGNIFICANT CHANGE UP
EOSINOPHIL # BLD AUTO: 0 K/UL — SIGNIFICANT CHANGE UP (ref 0–0.5)
EOSINOPHIL NFR BLD AUTO: 0 % — SIGNIFICANT CHANGE UP (ref 0–6)
GLUCOSE SERPL-MCNC: 137 MG/DL — HIGH (ref 70–99)
HCT VFR BLD CALC: 41.4 % — SIGNIFICANT CHANGE UP (ref 34.5–45)
HGB BLD-MCNC: 14.1 G/DL — SIGNIFICANT CHANGE UP (ref 11.5–15.5)
IANC: 9.73 K/UL — HIGH (ref 1.8–7.4)
IMM GRANULOCYTES NFR BLD AUTO: 0.4 % — SIGNIFICANT CHANGE UP (ref 0–0.9)
LYMPHOCYTES # BLD AUTO: 1.45 K/UL — SIGNIFICANT CHANGE UP (ref 1–3.3)
LYMPHOCYTES # BLD AUTO: 11.8 % — LOW (ref 13–44)
MAGNESIUM SERPL-MCNC: 1.8 MG/DL — SIGNIFICANT CHANGE UP (ref 1.6–2.6)
MCHC RBC-ENTMCNC: 31.8 PG — SIGNIFICANT CHANGE UP (ref 27–34)
MCHC RBC-ENTMCNC: 34.1 GM/DL — SIGNIFICANT CHANGE UP (ref 32–36)
MCV RBC AUTO: 93.2 FL — SIGNIFICANT CHANGE UP (ref 80–100)
MONOCYTES # BLD AUTO: 1.03 K/UL — HIGH (ref 0–0.9)
MONOCYTES NFR BLD AUTO: 8.4 % — SIGNIFICANT CHANGE UP (ref 2–14)
NEUTROPHILS # BLD AUTO: 9.73 K/UL — HIGH (ref 1.8–7.4)
NEUTROPHILS NFR BLD AUTO: 79.3 % — HIGH (ref 43–77)
NRBC # BLD: 0 /100 WBCS — SIGNIFICANT CHANGE UP (ref 0–0)
NRBC # FLD: 0 K/UL — SIGNIFICANT CHANGE UP (ref 0–0)
PHOSPHATE SERPL-MCNC: 4.7 MG/DL — HIGH (ref 2.5–4.5)
PLATELET # BLD AUTO: 257 K/UL — SIGNIFICANT CHANGE UP (ref 150–400)
POTASSIUM SERPL-MCNC: 4.7 MMOL/L — SIGNIFICANT CHANGE UP (ref 3.5–5.3)
POTASSIUM SERPL-SCNC: 4.7 MMOL/L — SIGNIFICANT CHANGE UP (ref 3.5–5.3)
RBC # BLD: 4.44 M/UL — SIGNIFICANT CHANGE UP (ref 3.8–5.2)
RBC # FLD: 12.4 % — SIGNIFICANT CHANGE UP (ref 10.3–14.5)
SODIUM SERPL-SCNC: 137 MMOL/L — SIGNIFICANT CHANGE UP (ref 135–145)
WBC # BLD: 12.27 K/UL — HIGH (ref 3.8–10.5)
WBC # FLD AUTO: 12.27 K/UL — HIGH (ref 3.8–10.5)

## 2024-06-19 PROCEDURE — 99232 SBSQ HOSP IP/OBS MODERATE 35: CPT

## 2024-06-19 PROCEDURE — 71045 X-RAY EXAM CHEST 1 VIEW: CPT | Mod: 26

## 2024-06-19 RX ORDER — DORNASE ALFA 1 MG/ML
2.5 SOLUTION RESPIRATORY (INHALATION) DAILY
Refills: 0 | Status: DISCONTINUED | OUTPATIENT
Start: 2024-06-19 | End: 2024-06-21

## 2024-06-19 RX ORDER — METOCLOPRAMIDE 5 MG/5ML
10 SOLUTION ORAL ONCE
Refills: 0 | Status: COMPLETED | OUTPATIENT
Start: 2024-06-19 | End: 2024-06-20

## 2024-06-19 RX ORDER — SODIUM CHLORIDE 0.9 % (FLUSH) 0.9 %
4 SYRINGE (ML) INJECTION EVERY 6 HOURS
Refills: 0 | Status: DISCONTINUED | OUTPATIENT
Start: 2024-06-19 | End: 2024-06-21

## 2024-06-19 RX ORDER — MAGNESIUM SULFATE 100 %
2 POWDER (GRAM) MISCELLANEOUS ONCE
Refills: 0 | Status: COMPLETED | OUTPATIENT
Start: 2024-06-19 | End: 2024-06-19

## 2024-06-19 RX ORDER — LIDOCAINE HCL 28 MG/G
1 GEL TOPICAL EVERY 24 HOURS
Refills: 0 | Status: DISCONTINUED | OUTPATIENT
Start: 2024-06-19 | End: 2024-06-21

## 2024-06-19 RX ORDER — ACETAMINOPHEN 325 MG
1000 TABLET ORAL ONCE
Refills: 0 | Status: COMPLETED | OUTPATIENT
Start: 2024-06-19 | End: 2024-06-19

## 2024-06-19 RX ORDER — POLYETHYLENE GLYCOL 3350 1 G/G
17 POWDER ORAL DAILY
Refills: 0 | Status: DISCONTINUED | OUTPATIENT
Start: 2024-06-19 | End: 2024-06-21

## 2024-06-19 RX ADMIN — LIDOCAINE HCL 1 PATCH: 28 GEL TOPICAL at 19:17

## 2024-06-19 RX ADMIN — PANTOPRAZOLE SODIUM 40 MILLIGRAM(S): 40 INJECTION, POWDER, FOR SOLUTION INTRAVENOUS at 06:50

## 2024-06-19 RX ADMIN — HEPARIN SODIUM 5000 UNIT(S): 50 INJECTION, SOLUTION INTRAVENOUS at 06:44

## 2024-06-19 RX ADMIN — DORNASE ALFA 2.5 MILLIGRAM(S): 1 SOLUTION RESPIRATORY (INHALATION) at 06:45

## 2024-06-19 RX ADMIN — HYDROMORPHONE HCL 30 MILLILITER(S): 0.2 INJECTION, SOLUTION INTRAVENOUS at 07:11

## 2024-06-19 RX ADMIN — Medication 3 MILLILITER(S): at 05:22

## 2024-06-19 RX ADMIN — DEXTROSE MONOHYDRATE AND SODIUM CHLORIDE 30 MILLILITER(S): 5; .3 INJECTION, SOLUTION INTRAVENOUS at 07:34

## 2024-06-19 RX ADMIN — LIDOCAINE HCL 1 PATCH: 28 GEL TOPICAL at 11:20

## 2024-06-19 RX ADMIN — HEPARIN SODIUM 5000 UNIT(S): 50 INJECTION, SOLUTION INTRAVENOUS at 22:04

## 2024-06-19 RX ADMIN — ONDANSETRON HYDROCHLORIDE 4 MILLIGRAM(S): 2 INJECTION INTRAMUSCULAR; INTRAVENOUS at 00:13

## 2024-06-19 RX ADMIN — Medication 400 MILLIGRAM(S): at 00:13

## 2024-06-19 RX ADMIN — Medication 4 MILLILITER(S): at 15:05

## 2024-06-19 RX ADMIN — Medication 2 TABLET(S): at 23:03

## 2024-06-19 RX ADMIN — HYDROMORPHONE HCL 30 MILLILITER(S): 0.2 INJECTION, SOLUTION INTRAVENOUS at 19:09

## 2024-06-19 RX ADMIN — Medication 400 MILLIGRAM(S): at 06:44

## 2024-06-19 RX ADMIN — Medication 3 MILLILITER(S): at 21:17

## 2024-06-19 RX ADMIN — DEXTROSE MONOHYDRATE AND SODIUM CHLORIDE 30 MILLILITER(S): 5; .3 INJECTION, SOLUTION INTRAVENOUS at 19:08

## 2024-06-19 RX ADMIN — Medication 1000 MILLIGRAM(S): at 00:28

## 2024-06-19 RX ADMIN — Medication 1000 MILLIGRAM(S): at 07:05

## 2024-06-19 RX ADMIN — Medication 25 GRAM(S): at 06:44

## 2024-06-19 RX ADMIN — Medication 4 MILLILITER(S): at 06:45

## 2024-06-20 LAB
ANION GAP SERPL CALC-SCNC: 11 MMOL/L — SIGNIFICANT CHANGE UP (ref 7–14)
BUN SERPL-MCNC: 18 MG/DL — SIGNIFICANT CHANGE UP (ref 7–23)
CALCIUM SERPL-MCNC: 8.9 MG/DL — SIGNIFICANT CHANGE UP (ref 8.4–10.5)
CHLORIDE SERPL-SCNC: 102 MMOL/L — SIGNIFICANT CHANGE UP (ref 98–107)
CO2 SERPL-SCNC: 24 MMOL/L — SIGNIFICANT CHANGE UP (ref 22–31)
CREAT SERPL-MCNC: 1.01 MG/DL — SIGNIFICANT CHANGE UP (ref 0.5–1.3)
EGFR: 60 ML/MIN/1.73M2 — SIGNIFICANT CHANGE UP
GLUCOSE SERPL-MCNC: 89 MG/DL — SIGNIFICANT CHANGE UP (ref 70–99)
HCT VFR BLD CALC: 40 % — SIGNIFICANT CHANGE UP (ref 34.5–45)
HGB BLD-MCNC: 13.3 G/DL — SIGNIFICANT CHANGE UP (ref 11.5–15.5)
MAGNESIUM SERPL-MCNC: 1.9 MG/DL — SIGNIFICANT CHANGE UP (ref 1.6–2.6)
MCHC RBC-ENTMCNC: 31.3 PG — SIGNIFICANT CHANGE UP (ref 27–34)
MCHC RBC-ENTMCNC: 33.3 GM/DL — SIGNIFICANT CHANGE UP (ref 32–36)
MCV RBC AUTO: 94.1 FL — SIGNIFICANT CHANGE UP (ref 80–100)
NRBC # BLD: 0 /100 WBCS — SIGNIFICANT CHANGE UP (ref 0–0)
NRBC # FLD: 0 K/UL — SIGNIFICANT CHANGE UP (ref 0–0)
PHOSPHATE SERPL-MCNC: 2.5 MG/DL — SIGNIFICANT CHANGE UP (ref 2.5–4.5)
PLATELET # BLD AUTO: 230 K/UL — SIGNIFICANT CHANGE UP (ref 150–400)
POTASSIUM SERPL-MCNC: 4.3 MMOL/L — SIGNIFICANT CHANGE UP (ref 3.5–5.3)
POTASSIUM SERPL-SCNC: 4.3 MMOL/L — SIGNIFICANT CHANGE UP (ref 3.5–5.3)
RBC # BLD: 4.25 M/UL — SIGNIFICANT CHANGE UP (ref 3.8–5.2)
RBC # FLD: 12.8 % — SIGNIFICANT CHANGE UP (ref 10.3–14.5)
SODIUM SERPL-SCNC: 137 MMOL/L — SIGNIFICANT CHANGE UP (ref 135–145)
WBC # BLD: 12.19 K/UL — HIGH (ref 3.8–10.5)
WBC # FLD AUTO: 12.19 K/UL — HIGH (ref 3.8–10.5)

## 2024-06-20 PROCEDURE — 99233 SBSQ HOSP IP/OBS HIGH 50: CPT

## 2024-06-20 PROCEDURE — 71045 X-RAY EXAM CHEST 1 VIEW: CPT | Mod: 26

## 2024-06-20 RX ORDER — HYDROMORPHONE HCL 0.2 MG/ML
2 INJECTION, SOLUTION INTRAVENOUS
Refills: 0 | Status: DISCONTINUED | OUTPATIENT
Start: 2024-06-20 | End: 2024-06-21

## 2024-06-20 RX ORDER — SOD PHOS DI, MONO/K PHOS MONO 250 MG
1 TABLET ORAL ONCE
Refills: 0 | Status: COMPLETED | OUTPATIENT
Start: 2024-06-20 | End: 2024-06-20

## 2024-06-20 RX ORDER — POLYETHYLENE GLYCOL 3350 1 G/G
17 POWDER ORAL ONCE
Refills: 0 | Status: COMPLETED | OUTPATIENT
Start: 2024-06-20 | End: 2024-06-20

## 2024-06-20 RX ORDER — HYDROMORPHONE HCL 0.2 MG/ML
0.3 INJECTION, SOLUTION INTRAVENOUS
Refills: 0 | Status: DISCONTINUED | OUTPATIENT
Start: 2024-06-20 | End: 2024-06-21

## 2024-06-20 RX ORDER — ACETAMINOPHEN 325 MG
1000 TABLET ORAL ONCE
Refills: 0 | Status: COMPLETED | OUTPATIENT
Start: 2024-06-20 | End: 2024-06-20

## 2024-06-20 RX ORDER — MAGNESIUM SULFATE 100 %
2 POWDER (GRAM) MISCELLANEOUS ONCE
Refills: 0 | Status: COMPLETED | OUTPATIENT
Start: 2024-06-20 | End: 2024-06-20

## 2024-06-20 RX ORDER — ACETAMINOPHEN 325 MG
650 TABLET ORAL EVERY 6 HOURS
Refills: 0 | Status: DISCONTINUED | OUTPATIENT
Start: 2024-06-20 | End: 2024-06-21

## 2024-06-20 RX ADMIN — HYDROMORPHONE HCL 2 MILLIGRAM(S): 0.2 INJECTION, SOLUTION INTRAVENOUS at 22:46

## 2024-06-20 RX ADMIN — HEPARIN SODIUM 5000 UNIT(S): 50 INJECTION, SOLUTION INTRAVENOUS at 14:17

## 2024-06-20 RX ADMIN — Medication 150 GRAM(S): at 09:44

## 2024-06-20 RX ADMIN — PANTOPRAZOLE SODIUM 40 MILLIGRAM(S): 40 INJECTION, POWDER, FOR SOLUTION INTRAVENOUS at 09:44

## 2024-06-20 RX ADMIN — Medication 1000 MILLIGRAM(S): at 06:01

## 2024-06-20 RX ADMIN — Medication 4 MILLILITER(S): at 04:04

## 2024-06-20 RX ADMIN — LIDOCAINE HCL 1 PATCH: 28 GEL TOPICAL at 00:49

## 2024-06-20 RX ADMIN — Medication 4 MILLILITER(S): at 16:29

## 2024-06-20 RX ADMIN — METOCLOPRAMIDE 10 MILLIGRAM(S): 5 SOLUTION ORAL at 09:46

## 2024-06-20 RX ADMIN — HEPARIN SODIUM 5000 UNIT(S): 50 INJECTION, SOLUTION INTRAVENOUS at 22:15

## 2024-06-20 RX ADMIN — Medication 650 MILLIGRAM(S): at 16:16

## 2024-06-20 RX ADMIN — POLYETHYLENE GLYCOL 3350 17 GRAM(S): 1 POWDER ORAL at 06:54

## 2024-06-20 RX ADMIN — Medication 1 PACKET(S): at 06:37

## 2024-06-20 RX ADMIN — DORNASE ALFA 2.5 MILLIGRAM(S): 1 SOLUTION RESPIRATORY (INHALATION) at 11:18

## 2024-06-20 RX ADMIN — HYDROMORPHONE HCL 0.3 MILLIGRAM(S): 0.2 INJECTION, SOLUTION INTRAVENOUS at 23:37

## 2024-06-20 RX ADMIN — HYDROMORPHONE HCL 2 MILLIGRAM(S): 0.2 INJECTION, SOLUTION INTRAVENOUS at 22:13

## 2024-06-20 RX ADMIN — Medication 4 MILLILITER(S): at 11:12

## 2024-06-20 RX ADMIN — Medication 400 MILLIGRAM(S): at 05:46

## 2024-06-21 ENCOUNTER — TRANSCRIPTION ENCOUNTER (OUTPATIENT)
Age: 69
End: 2024-06-21

## 2024-06-21 VITALS
SYSTOLIC BLOOD PRESSURE: 149 MMHG | RESPIRATION RATE: 18 BRPM | HEART RATE: 72 BPM | TEMPERATURE: 97 F | DIASTOLIC BLOOD PRESSURE: 75 MMHG | OXYGEN SATURATION: 98 %

## 2024-06-21 LAB
ANION GAP SERPL CALC-SCNC: 11 MMOL/L — SIGNIFICANT CHANGE UP (ref 7–14)
BUN SERPL-MCNC: 21 MG/DL — SIGNIFICANT CHANGE UP (ref 7–23)
CALCIUM SERPL-MCNC: 8.6 MG/DL — SIGNIFICANT CHANGE UP (ref 8.4–10.5)
CHLORIDE SERPL-SCNC: 102 MMOL/L — SIGNIFICANT CHANGE UP (ref 98–107)
CO2 SERPL-SCNC: 25 MMOL/L — SIGNIFICANT CHANGE UP (ref 22–31)
CREAT SERPL-MCNC: 0.91 MG/DL — SIGNIFICANT CHANGE UP (ref 0.5–1.3)
EGFR: 68 ML/MIN/1.73M2 — SIGNIFICANT CHANGE UP
GLUCOSE SERPL-MCNC: 101 MG/DL — HIGH (ref 70–99)
HCT VFR BLD CALC: 40.7 % — SIGNIFICANT CHANGE UP (ref 34.5–45)
HGB BLD-MCNC: 13.5 G/DL — SIGNIFICANT CHANGE UP (ref 11.5–15.5)
MAGNESIUM SERPL-MCNC: 1.8 MG/DL — SIGNIFICANT CHANGE UP (ref 1.6–2.6)
MCHC RBC-ENTMCNC: 31.3 PG — SIGNIFICANT CHANGE UP (ref 27–34)
MCHC RBC-ENTMCNC: 33.2 GM/DL — SIGNIFICANT CHANGE UP (ref 32–36)
MCV RBC AUTO: 94.4 FL — SIGNIFICANT CHANGE UP (ref 80–100)
NRBC # BLD: 0 /100 WBCS — SIGNIFICANT CHANGE UP (ref 0–0)
NRBC # FLD: 0 K/UL — SIGNIFICANT CHANGE UP (ref 0–0)
PHOSPHATE SERPL-MCNC: 2.8 MG/DL — SIGNIFICANT CHANGE UP (ref 2.5–4.5)
PLATELET # BLD AUTO: 255 K/UL — SIGNIFICANT CHANGE UP (ref 150–400)
POTASSIUM SERPL-MCNC: 4.2 MMOL/L — SIGNIFICANT CHANGE UP (ref 3.5–5.3)
POTASSIUM SERPL-SCNC: 4.2 MMOL/L — SIGNIFICANT CHANGE UP (ref 3.5–5.3)
RBC # BLD: 4.31 M/UL — SIGNIFICANT CHANGE UP (ref 3.8–5.2)
RBC # FLD: 12.5 % — SIGNIFICANT CHANGE UP (ref 10.3–14.5)
SODIUM SERPL-SCNC: 138 MMOL/L — SIGNIFICANT CHANGE UP (ref 135–145)
WBC # BLD: 9.58 K/UL — SIGNIFICANT CHANGE UP (ref 3.8–10.5)
WBC # FLD AUTO: 9.58 K/UL — SIGNIFICANT CHANGE UP (ref 3.8–10.5)

## 2024-06-21 PROCEDURE — 71045 X-RAY EXAM CHEST 1 VIEW: CPT | Mod: 26,76

## 2024-06-21 RX ORDER — OXYCODONE HYDROCHLORIDE 100 MG/5ML
1 SOLUTION ORAL
Qty: 30 | Refills: 0
Start: 2024-06-21 | End: 2024-06-25

## 2024-06-21 RX ORDER — DIAZEPAM 5 MG
1 TABLET ORAL
Qty: 0 | Refills: 0 | DISCHARGE

## 2024-06-21 RX ORDER — SENNOSIDES 8.6 MG
2 TABLET ORAL
Qty: 0 | Refills: 0 | DISCHARGE
Start: 2024-06-21

## 2024-06-21 RX ORDER — LIDOCAINE HCL 28 MG/G
1 GEL TOPICAL DAILY
Refills: 0 | Status: DISCONTINUED | OUTPATIENT
Start: 2024-06-21 | End: 2024-06-21

## 2024-06-21 RX ORDER — ACETAMINOPHEN 325 MG
2 TABLET ORAL
Qty: 0 | Refills: 0 | DISCHARGE
Start: 2024-06-21

## 2024-06-21 RX ADMIN — LIDOCAINE HCL 1 PATCH: 28 GEL TOPICAL at 05:14

## 2024-06-21 RX ADMIN — Medication 650 MILLIGRAM(S): at 05:13

## 2024-06-21 RX ADMIN — Medication 650 MILLIGRAM(S): at 12:00

## 2024-06-21 RX ADMIN — Medication 650 MILLIGRAM(S): at 19:15

## 2024-06-21 RX ADMIN — PANTOPRAZOLE SODIUM 40 MILLIGRAM(S): 40 INJECTION, POWDER, FOR SOLUTION INTRAVENOUS at 05:13

## 2024-06-21 RX ADMIN — POLYETHYLENE GLYCOL 3350 17 GRAM(S): 1 POWDER ORAL at 11:18

## 2024-06-21 RX ADMIN — HYDROMORPHONE HCL 0.3 MILLIGRAM(S): 0.2 INJECTION, SOLUTION INTRAVENOUS at 00:15

## 2024-06-21 RX ADMIN — LIDOCAINE HCL 1 PATCH: 28 GEL TOPICAL at 10:02

## 2024-06-21 RX ADMIN — Medication 4 MILLILITER(S): at 09:58

## 2024-06-21 RX ADMIN — DORNASE ALFA 2.5 MILLIGRAM(S): 1 SOLUTION RESPIRATORY (INHALATION) at 09:58

## 2024-06-21 RX ADMIN — Medication 650 MILLIGRAM(S): at 11:18

## 2024-06-21 RX ADMIN — HEPARIN SODIUM 5000 UNIT(S): 50 INJECTION, SOLUTION INTRAVENOUS at 05:13

## 2024-06-24 PROBLEM — R59.0 LOCALIZED ENLARGED LYMPH NODES: Chronic | Status: ACTIVE | Noted: 2024-06-13

## 2024-06-24 PROBLEM — M48.061 SPINAL STENOSIS, LUMBAR REGION WITHOUT NEUROGENIC CLAUDICATION: Chronic | Status: ACTIVE | Noted: 2024-06-13

## 2024-06-24 PROBLEM — M19.90 UNSPECIFIED OSTEOARTHRITIS, UNSPECIFIED SITE: Chronic | Status: ACTIVE | Noted: 2024-06-13

## 2024-06-24 PROBLEM — I73.00 RAYNAUD'S SYNDROME WITHOUT GANGRENE: Chronic | Status: ACTIVE | Noted: 2024-06-13

## 2024-06-24 PROBLEM — K21.9 GASTRO-ESOPHAGEAL REFLUX DISEASE WITHOUT ESOPHAGITIS: Chronic | Status: ACTIVE | Noted: 2024-06-13

## 2024-06-24 PROBLEM — J84.112 IDIOPATHIC PULMONARY FIBROSIS: Chronic | Status: ACTIVE | Noted: 2024-06-13

## 2024-06-24 PROBLEM — I34.0 NONRHEUMATIC MITRAL (VALVE) INSUFFICIENCY: Chronic | Status: ACTIVE | Noted: 2024-06-13

## 2024-06-24 PROBLEM — M34.1 CR(E)ST SYNDROME: Chronic | Status: ACTIVE | Noted: 2024-06-13

## 2024-06-24 PROBLEM — R91.1 SOLITARY PULMONARY NODULE: Chronic | Status: ACTIVE | Noted: 2024-06-13

## 2024-06-24 PROBLEM — I10 ESSENTIAL (PRIMARY) HYPERTENSION: Chronic | Status: ACTIVE | Noted: 2024-06-13

## 2024-06-24 PROBLEM — B33.8 OTHER SPECIFIED VIRAL DISEASES: Chronic | Status: ACTIVE | Noted: 2024-06-13

## 2024-06-24 PROBLEM — M34.9 SYSTEMIC SCLEROSIS, UNSPECIFIED: Chronic | Status: ACTIVE | Noted: 2024-06-13

## 2024-06-24 LAB — SURGICAL PATHOLOGY STUDY: SIGNIFICANT CHANGE UP

## 2024-06-27 PROBLEM — C34.90 SQUAMOUS CELL CARCINOMA LUNG: Status: ACTIVE | Noted: 2024-05-17

## 2024-07-01 ENCOUNTER — APPOINTMENT (OUTPATIENT)
Dept: RADIOLOGY | Facility: CLINIC | Age: 69
End: 2024-07-01

## 2024-07-03 ENCOUNTER — APPOINTMENT (OUTPATIENT)
Dept: PULMONOLOGY | Facility: CLINIC | Age: 69
End: 2024-07-03
Payer: MEDICARE

## 2024-07-03 ENCOUNTER — NON-APPOINTMENT (OUTPATIENT)
Age: 69
End: 2024-07-03

## 2024-07-03 ENCOUNTER — APPOINTMENT (OUTPATIENT)
Dept: THORACIC SURGERY | Facility: CLINIC | Age: 69
End: 2024-07-03
Payer: MEDICARE

## 2024-07-03 VITALS
OXYGEN SATURATION: 98 % | WEIGHT: 146 LBS | HEART RATE: 80 BPM | HEIGHT: 61 IN | SYSTOLIC BLOOD PRESSURE: 170 MMHG | DIASTOLIC BLOOD PRESSURE: 95 MMHG | BODY MASS INDEX: 27.56 KG/M2

## 2024-07-03 DIAGNOSIS — C34.90 MALIGNANT NEOPLASM OF UNSPECIFIED PART OF UNSPECIFIED BRONCHUS OR LUNG: ICD-10-CM

## 2024-07-03 PROCEDURE — 71046 X-RAY EXAM CHEST 2 VIEWS: CPT

## 2024-07-03 PROCEDURE — 99024 POSTOP FOLLOW-UP VISIT: CPT

## 2024-07-27 ENCOUNTER — NON-APPOINTMENT (OUTPATIENT)
Age: 69
End: 2024-07-27

## 2024-10-09 ENCOUNTER — APPOINTMENT (OUTPATIENT)
Dept: THORACIC SURGERY | Facility: CLINIC | Age: 69
End: 2024-10-09
Payer: MEDICARE

## 2024-10-09 VITALS
SYSTOLIC BLOOD PRESSURE: 140 MMHG | OXYGEN SATURATION: 97 % | WEIGHT: 147 LBS | DIASTOLIC BLOOD PRESSURE: 90 MMHG | BODY MASS INDEX: 27.75 KG/M2 | HEIGHT: 61 IN | HEART RATE: 79 BPM

## 2024-10-09 DIAGNOSIS — C34.90 MALIGNANT NEOPLASM OF UNSPECIFIED PART OF UNSPECIFIED BRONCHUS OR LUNG: ICD-10-CM

## 2024-10-09 PROCEDURE — G2211 COMPLEX E/M VISIT ADD ON: CPT

## 2024-10-09 PROCEDURE — 99213 OFFICE O/P EST LOW 20 MIN: CPT

## 2025-01-09 ENCOUNTER — NON-APPOINTMENT (OUTPATIENT)
Age: 70
End: 2025-01-09

## 2025-02-26 ENCOUNTER — NON-APPOINTMENT (OUTPATIENT)
Age: 70
End: 2025-02-26

## 2025-02-26 ENCOUNTER — APPOINTMENT (OUTPATIENT)
Dept: THORACIC SURGERY | Facility: CLINIC | Age: 70
End: 2025-02-26

## 2025-02-26 DIAGNOSIS — R91.1 SOLITARY PULMONARY NODULE: ICD-10-CM

## 2025-02-26 DIAGNOSIS — C34.90 MALIGNANT NEOPLASM OF UNSPECIFIED PART OF UNSPECIFIED BRONCHUS OR LUNG: ICD-10-CM

## 2025-04-02 ENCOUNTER — APPOINTMENT (OUTPATIENT)
Dept: THORACIC SURGERY | Facility: CLINIC | Age: 70
End: 2025-04-02
Payer: MEDICARE

## 2025-04-02 VITALS
DIASTOLIC BLOOD PRESSURE: 98 MMHG | SYSTOLIC BLOOD PRESSURE: 174 MMHG | BODY MASS INDEX: 27.75 KG/M2 | HEIGHT: 61 IN | WEIGHT: 147 LBS | OXYGEN SATURATION: 99 % | HEART RATE: 75 BPM

## 2025-04-02 VITALS — SYSTOLIC BLOOD PRESSURE: 170 MMHG | DIASTOLIC BLOOD PRESSURE: 88 MMHG

## 2025-04-02 DIAGNOSIS — C34.90 MALIGNANT NEOPLASM OF UNSPECIFIED PART OF UNSPECIFIED BRONCHUS OR LUNG: ICD-10-CM

## 2025-04-02 PROCEDURE — 99214 OFFICE O/P EST MOD 30 MIN: CPT

## (undated) DEVICE — STAPLER COVIDIEN ENDO GIA STANDARD HANDLE

## (undated) DEVICE — XI CORD BIPOLAR CAUTERY (BLUE)

## (undated) DEVICE — SPECIMEN CONTAINER 100ML

## (undated) DEVICE — LUBRICANT INST ELECTROLUBE Z SOLUTION

## (undated) DEVICE — TUBING OLYMPUS INSUFFLATION

## (undated) DEVICE — D HELP - CLEARVIEW CLEARIFY SYSTEM

## (undated) DEVICE — MEDICATION LABELS W MARKER

## (undated) DEVICE — DRSG STERISTRIPS 0.5 X 4"

## (undated) DEVICE — PACK ROBOTIC LIJ

## (undated) DEVICE — VENODYNE/SCD SLEEVE CALF MEDIUM

## (undated) DEVICE — ENDOCATCH 10MM SPECIMEN POUCH

## (undated) DEVICE — SOL IRR POUR NS 0.9% 500ML

## (undated) DEVICE — MARKING PEN W RULER

## (undated) DEVICE — POSITIONER FOAM HEAD CRADLE (PINK)

## (undated) DEVICE — GOWN XL

## (undated) DEVICE — XI ARM GRASPER TIP UP FENESTRATED

## (undated) DEVICE — XI SEAL UNIVERSIAL 5-12MM

## (undated) DEVICE — DRSG ALLEVYN BORDER LITE 2X2"

## (undated) DEVICE — XI DRAPE COLUMN

## (undated) DEVICE — ELCTR BOVIE PENCIL SMOKE EVACUATION

## (undated) DEVICE — TROCAR COVIDIEN VERSASTEP PLUS 15MM STANDARD

## (undated) DEVICE — WARMING BLANKET UPPER ADULT

## (undated) DEVICE — ENDOCATCH GENERAL 15MM (PURPLE)

## (undated) DEVICE — TUBING SUCTION 20FT

## (undated) DEVICE — BLADE SURGICAL #10 STAINLESS

## (undated) DEVICE — SUT POLYSORB 2-0 30" V-20 UNDYED

## (undated) DEVICE — SUT SURGIPRO 0 30" GS-22

## (undated) DEVICE — TROCAR COVIDIEN VERSASTEP PLUS 12MM LONG

## (undated) DEVICE — TUBING AIRSEAL TRI-LUMEN FILTERED

## (undated) DEVICE — FOLEY TRAY 16FR 5CC LTX UMETER CLOSED

## (undated) DEVICE — DRAPE INSTRUMENT POUCH 6.75" X 11"

## (undated) DEVICE — ENDOCATCH II 15MM

## (undated) DEVICE — ELCTR BOVIE TIP BLADE INSULATED 2.75" EDGE

## (undated) DEVICE — TUBING STRYKEFLOW II SUCTION / IRRIGATOR

## (undated) DEVICE — SUT POLYSORB 4-0 P-12 UNDYED

## (undated) DEVICE — XI ARM GRASPER BIPOLAR LONG 8MM

## (undated) DEVICE — XI ARM FORCEP FENESTRATED BIPOLAR 8MM

## (undated) DEVICE — XI DRAPE ARM

## (undated) DEVICE — DRAPE MAYO STAND 30"